# Patient Record
(demographics unavailable — no encounter records)

---

## 2024-11-27 NOTE — PHYSICAL EXAM
[de-identified] : Exam she is got well-healed surgical incision on the left anterolateral neck.  Her range of motion is about 75% of normal.  Grossly the strength in her upper extremities is preserved, some mild weakness of the right triceps when compared to the left.  Reflexes are 1+ symmetrically.  No pathologic reflexes noted.  She got reduced light touch sensation in the C7 dermatome on the right compared to the left. [de-identified] : 4 X-rays including flexion-extension demonstrates postsurgical changes at C5-6 with a disc replacement the demonstrates motion on flexion-extension.  C6-7 she has got disc collapse with anterior osteophyte formation and uncovertebral joint hypertrophy.

## 2024-11-27 NOTE — ASSESSMENT
[FreeTextEntry1] : Janet has signs and symptoms consistent with cervical radiculopathy.  Has been going on now for more than a year.  I am recommending a new MRI scan cervical spine.  She is amenable to this.  When she has that done she will follow-up with me and we will discuss any potential treatment options.  She is comfortable with this plan.  All of her questions were addressed

## 2024-11-27 NOTE — HISTORY OF PRESENT ILLNESS
[de-identified] : Janet is 41 years old and she is known to me from prior surgery.  I have not seen her in several years.  She has been under the care of her primary physician.  She reports that she had progressively worsening numbness and pain primarily in the right upper extremity.  She was diagnosed with carpal tunnel syndrome.  She had carpal tunnel release about a year ago which helped considerably with her hand but she still has symptoms that are radiating from the neck down below the shoulder and elbow on the right.  She does have some numbness in the left upper extremity also.  No gross weakness that she is noted.  She has been through physical therapy without any improvement.  She has not had any injections or anything yet.

## 2025-01-23 NOTE — HISTORY OF PRESENT ILLNESS
[de-identified] : Janet returns today for follow-up.  Her complaints have been persistent and on a consistent basis.  She has neck pain radiating to both of her shoulders.  She has numbness and tingling in the fingers bilaterally.  That is worse at nighttime.  No new symptoms.  No recent changes in her bowel or bladder habits.  No weakness noted.  She is here to review the MRI scan.

## 2025-01-23 NOTE — ASSESSMENT
[FreeTextEntry1] : We had a lengthy talk today in the office.  I think her symptoms are related mostly to the C6-7 level but I am unable to discount the C4-5 level entirely.  She seems pretty miserable.  She does not want to pursue pain management.  She has done physical therapy in the past without improvement.  She takes NSAIDs daily.  I think she is a candidate for surgery.  I would suggest disc replacement deafly C6-7 and I think would be reasonable to C4-5 at the same time.  We talked about the surgery including pros cons risks etc.  She would like to move forward with surgery.  She like to try to do it in February if possible she has family plans in March and April.  If that is not possible, she would rather not but she may have to delay this until May.  Will work to get authorization, clearance, and scheduling done by the end of February.

## 2025-01-23 NOTE — PHYSICAL EXAM
[de-identified] : I independently reviewed an MRI scan of the cervical spine.  It demonstrates postsurgical changes at C5-6 with a prior disc replacement.  Is got a patent canal no evidence of stenosis.  She has disc collapse at C6-7 with central and bilateral foraminal stenosis moderate in nature.  She also has a central left paracentral herniation at C4-5.